# Patient Record
Sex: FEMALE | Race: BLACK OR AFRICAN AMERICAN | NOT HISPANIC OR LATINO | URBAN - METROPOLITAN AREA
[De-identification: names, ages, dates, MRNs, and addresses within clinical notes are randomized per-mention and may not be internally consistent; named-entity substitution may affect disease eponyms.]

---

## 2018-05-24 ENCOUNTER — IMPORTED ENCOUNTER (OUTPATIENT)
Dept: URBAN - METROPOLITAN AREA CLINIC 9 | Facility: CLINIC | Age: 73
End: 2018-05-24

## 2018-07-09 ENCOUNTER — IMPORTED ENCOUNTER (OUTPATIENT)
Dept: URBAN - METROPOLITAN AREA CLINIC 9 | Facility: CLINIC | Age: 73
End: 2018-07-09

## 2018-07-20 ENCOUNTER — IMPORTED ENCOUNTER (OUTPATIENT)
Dept: URBAN - METROPOLITAN AREA CLINIC 9 | Facility: CLINIC | Age: 73
End: 2018-07-20

## 2018-07-24 ENCOUNTER — IMPORTED ENCOUNTER (OUTPATIENT)
Dept: URBAN - METROPOLITAN AREA CLINIC 9 | Facility: CLINIC | Age: 73
End: 2018-07-24

## 2018-08-29 PROBLEM — Z96.1: Noted: 2018-08-29

## 2018-08-29 PROBLEM — Z96.1: Noted: 2018-07-19

## 2018-08-29 PROBLEM — Z98.890: Noted: 2022-05-19

## 2018-08-29 PROBLEM — Z98.890: Noted: 2022-05-09

## 2018-08-30 ENCOUNTER — IMPORTED ENCOUNTER (OUTPATIENT)
Dept: URBAN - METROPOLITAN AREA CLINIC 9 | Facility: CLINIC | Age: 73
End: 2018-08-30

## 2018-09-20 ENCOUNTER — IMPORTED ENCOUNTER (OUTPATIENT)
Dept: URBAN - METROPOLITAN AREA CLINIC 9 | Facility: CLINIC | Age: 73
End: 2018-09-20

## 2020-12-15 NOTE — PATIENT DISCUSSION
12/15/2020:  ed re-start gtts OD at [facility] and taper WITH OS (pt tapered gtts DAILY and has been off gtts OD for 3 days now! ).  ed may need thus to buy an extra bottle of PMN gtts to finish schedule.

## 2021-01-14 NOTE — PATIENT DISCUSSION
This visual field clearly demonstrated a minimum of 49% loss of upper field of vision OU, with upper lid skin in repose and elevated by taping of the lid to demonstrate potential correction. This field shows that taping the lids significantly improved this patient's superior field of vision by approximately 43%, OU.

## 2021-10-17 ASSESSMENT — KERATOMETRY
OS_AXISANGLE2_DEGREES: 84
OD_K1POWER_DIOPTERS: 43.75
OS_K2POWER_DIOPTERS: 43.75
OD_AXISANGLE2_DEGREES: 41
OS_AXISANGLE2_DEGREES: 163
OS_AXISANGLE_DEGREES: 73
OD_AXISANGLE_DEGREES: 64
OD_AXISANGLE_DEGREES: 131
OD_AXISANGLE2_DEGREES: 154
OS_K1POWER_DIOPTERS: 43.25
OS_K2POWER_DIOPTERS: 43.5
OD_K2POWER_DIOPTERS: 44.25
OD_K2POWER_DIOPTERS: 44
OD_K1POWER_DIOPTERS: 43.625
OS_K1POWER_DIOPTERS: 43.5
OS_AXISANGLE_DEGREES: 174

## 2021-10-17 ASSESSMENT — VISUAL ACUITY
OS_SC: 20/40 + SN
OD_CC: 20/25 SN
OD_SC: 20/50 +2 SN
OS_SC: 20/40 SN
OS_SC: 20/25 -2 SN
OS_CC: 20/25 SN
OD_CC: 20/40 SN
OD_SC: 20/40 SN
OS_SC: 20/50 + SN
OD_SC: 20/30 +2 SN
OS_CC: 20/40 SN

## 2021-10-17 ASSESSMENT — TONOMETRY
OD_IOP_MMHG: 16
OD_IOP_MMHG: 11
OS_IOP_MMHG: 13
OS_IOP_MMHG: 10
OS_IOP_MMHG: 11
OD_IOP_MMHG: 11
OS_IOP_MMHG: 15

## 2021-11-05 ENCOUNTER — PREPPED CHART (OUTPATIENT)
Dept: URBAN - NONMETROPOLITAN AREA CLINIC 6 | Facility: CLINIC | Age: 76
End: 2021-11-05

## 2021-11-05 ASSESSMENT — KERATOMETRY
OD_AXISANGLE2_DEGREES: 154
OD_AXISANGLE_DEGREES: 64
OS_AXISANGLE2_DEGREES: 163
OD_K1POWER_DIOPTERS: 43.625
OD_K2POWER_DIOPTERS: 44
OS_K1POWER_DIOPTERS: 43.5
OS_K2POWER_DIOPTERS: 43.75
OS_AXISANGLE_DEGREES: 73

## 2021-11-09 ENCOUNTER — ESTABLISHED PATIENT (OUTPATIENT)
Dept: URBAN - NONMETROPOLITAN AREA CLINIC 6 | Facility: CLINIC | Age: 76
End: 2021-11-09

## 2021-11-09 DIAGNOSIS — E11.9: ICD-10-CM

## 2021-11-09 DIAGNOSIS — H26.492: ICD-10-CM

## 2021-11-09 DIAGNOSIS — H04.122: ICD-10-CM

## 2021-11-09 DIAGNOSIS — H04.121: ICD-10-CM

## 2021-11-09 DIAGNOSIS — H26.491: ICD-10-CM

## 2021-11-09 PROCEDURE — 99214 OFFICE O/P EST MOD 30 MIN: CPT

## 2021-11-09 ASSESSMENT — KERATOMETRY
OS_K2POWER_DIOPTERS: 43.75
OD_K2POWER_DIOPTERS: 44
OD_K1POWER_DIOPTERS: 44.00
OD_AXISANGLE_DEGREES: 27
OS_AXISANGLE2_DEGREES: 163
OD_AXISANGLE_DEGREES: 64
OD_AXISANGLE2_DEGREES: 154
OD_AXISANGLE2_DEGREES: 117
OS_AXISANGLE_DEGREES: 78
OD_K1POWER_DIOPTERS: 43.625
OS_AXISANGLE_DEGREES: 73
OS_AXISANGLE2_DEGREES: 168
OS_K1POWER_DIOPTERS: 43.50
OS_K1POWER_DIOPTERS: 43.5
OD_K2POWER_DIOPTERS: 44.50

## 2021-11-09 ASSESSMENT — VISUAL ACUITY
OU_SC: 20/25
OS_SC: 20/25-2
OD_SC: 20/25-1

## 2021-11-09 ASSESSMENT — TONOMETRY
OS_IOP_MMHG: 13
OD_IOP_MMHG: 14

## 2022-05-09 ENCOUNTER — CLINIC PROCEDURE ONLY (OUTPATIENT)
Dept: URBAN - NONMETROPOLITAN AREA CLINIC 6 | Facility: CLINIC | Age: 77
End: 2022-05-09

## 2022-05-09 DIAGNOSIS — H26.493: ICD-10-CM

## 2022-05-09 PROCEDURE — 66821 AFTER CATARACT LASER SURGERY: CPT

## 2022-05-09 ASSESSMENT — VISUAL ACUITY
OD_SC: 20/30-1
OU_SC: 20/30-1
OS_SC: 20/30-2

## 2022-05-09 ASSESSMENT — TONOMETRY: OD_IOP_MMHG: 11

## 2022-05-19 ENCOUNTER — POST-OP (OUTPATIENT)
Dept: URBAN - NONMETROPOLITAN AREA CLINIC 6 | Facility: CLINIC | Age: 77
End: 2022-05-19

## 2022-05-19 DIAGNOSIS — Z98.890: ICD-10-CM

## 2022-05-19 DIAGNOSIS — H26.492: ICD-10-CM

## 2022-05-19 PROBLEM — Z96.1: Noted: 2018-07-19

## 2022-05-19 PROBLEM — Z96.1: Noted: 2018-08-29

## 2022-05-19 PROCEDURE — 66821 AFTER CATARACT LASER SURGERY: CPT

## 2022-05-19 PROCEDURE — 99024 POSTOP FOLLOW-UP VISIT: CPT

## 2022-05-19 ASSESSMENT — KERATOMETRY
OD_AXISANGLE_DEGREES: 102
OD_AXISANGLE2_DEGREES: 12
OD_K2POWER_DIOPTERS: 44.00
OD_K1POWER_DIOPTERS: 43.50

## 2022-05-19 ASSESSMENT — TONOMETRY: OD_IOP_MMHG: 13

## 2022-05-19 ASSESSMENT — VISUAL ACUITY: OD_SC: 20/20-2

## 2022-06-01 ENCOUNTER — POST-OP (OUTPATIENT)
Dept: URBAN - NONMETROPOLITAN AREA CLINIC 6 | Facility: CLINIC | Age: 77
End: 2022-06-01

## 2022-06-01 DIAGNOSIS — Z98.890: ICD-10-CM

## 2022-06-01 PROCEDURE — 99024 POSTOP FOLLOW-UP VISIT: CPT

## 2022-06-01 ASSESSMENT — VISUAL ACUITY
OD_SC: 20/20
OU_SC: 20/20
OS_SC: 20/25-1

## 2022-06-01 ASSESSMENT — KERATOMETRY
OS_AXISANGLE2_DEGREES: 133
OS_AXISANGLE_DEGREES: 43
OD_K1POWER_DIOPTERS: 43.25
OS_K1POWER_DIOPTERS: 43.25
OS_K2POWER_DIOPTERS: 43.50
OD_AXISANGLE2_DEGREES: 9
OD_AXISANGLE_DEGREES: 99
OD_K2POWER_DIOPTERS: 44.00

## 2022-06-01 ASSESSMENT — TONOMETRY
OD_IOP_MMHG: 13
OS_IOP_MMHG: 14

## 2022-11-02 ENCOUNTER — COMPREHENSIVE EXAM (OUTPATIENT)
Dept: URBAN - NONMETROPOLITAN AREA CLINIC 6 | Facility: CLINIC | Age: 77
End: 2022-11-02

## 2022-11-02 DIAGNOSIS — H04.123: ICD-10-CM

## 2022-11-02 DIAGNOSIS — E11.9: ICD-10-CM

## 2022-11-02 DIAGNOSIS — Z96.1: ICD-10-CM

## 2022-11-02 PROCEDURE — 92014 COMPRE OPH EXAM EST PT 1/>: CPT

## 2022-11-02 PROCEDURE — 92015 DETERMINE REFRACTIVE STATE: CPT

## 2022-11-02 ASSESSMENT — TONOMETRY
OD_IOP_MMHG: 13
OS_IOP_MMHG: 14

## 2022-11-02 ASSESSMENT — VISUAL ACUITY
OS_SC: 20/20
OU_SC: 20/20
OD_SC: 20/20

## 2022-11-02 ASSESSMENT — KERATOMETRY
OD_AXISANGLE2_DEGREES: 90
OD_K1POWER_DIOPTERS: 43.75
OD_K2POWER_DIOPTERS: 44.25
OS_AXISANGLE2_DEGREES: 122
OS_AXISANGLE_DEGREES: 32
OD_AXISANGLE_DEGREES: 180
OS_K2POWER_DIOPTERS: 43.75
OS_K1POWER_DIOPTERS: 43.25

## 2023-07-16 NOTE — PATIENT DISCUSSION
CATARACT SURGERY PLANNED FOR TDAY - OD.
Continue current management.
GRADE 2 TODAY.
MONITOR.
PLAN CE IOL OD BASIC MUNIR THEN OS BASIC MUNIR.
Post op gtt instructions reviewed with patient.
Pt chooses Basic bimal understands the need for glasses for distance and near.
The patient feels that the cataract is significantly impacting daily activities and has elected cataract surgery. The risks, benefits, and alternatives to surgery were discussed. The patient elects to proceed with surgery.
The types of intraocular lenses were reviewed with the patient along with a discussion of their various strengths and weaknesses.
iMPRIMIS.
verbal cues/1 person assist

## 2023-12-11 ENCOUNTER — ESTABLISHED PATIENT (OUTPATIENT)
Dept: URBAN - NONMETROPOLITAN AREA CLINIC 6 | Facility: CLINIC | Age: 78
End: 2023-12-11

## 2023-12-11 DIAGNOSIS — H04.123: ICD-10-CM

## 2023-12-11 DIAGNOSIS — E11.9: ICD-10-CM

## 2023-12-11 PROCEDURE — 92014 COMPRE OPH EXAM EST PT 1/>: CPT

## 2023-12-11 ASSESSMENT — VISUAL ACUITY
OS_SC: 20/25
OU_SC: 20/20-2
OD_SC: 20/20-2

## 2023-12-11 ASSESSMENT — KERATOMETRY
OS_AXISANGLE_DEGREES: 180
OS_K2POWER_DIOPTERS: 43.75
OD_K1POWER_DIOPTERS: 43.75
OS_K1POWER_DIOPTERS: 43.50
OS_AXISANGLE2_DEGREES: 90
OD_AXISANGLE_DEGREES: 141
OD_K2POWER_DIOPTERS: 44.25
OD_AXISANGLE2_DEGREES: 51

## 2023-12-11 ASSESSMENT — TONOMETRY
OS_IOP_MMHG: 11
OD_IOP_MMHG: 13

## 2024-10-03 ENCOUNTER — EMERGENCY VISIT (OUTPATIENT)
Dept: URBAN - NONMETROPOLITAN AREA CLINIC 6 | Facility: CLINIC | Age: 79
End: 2024-10-03

## 2024-10-03 DIAGNOSIS — H04.123: ICD-10-CM

## 2024-10-03 PROCEDURE — 92012 INTRM OPH EXAM EST PATIENT: CPT

## 2024-12-19 ENCOUNTER — COMPREHENSIVE EXAM (OUTPATIENT)
Age: 79
End: 2024-12-19

## 2024-12-19 DIAGNOSIS — H04.123: ICD-10-CM

## 2024-12-19 DIAGNOSIS — E11.9: ICD-10-CM

## 2024-12-19 PROCEDURE — 92014 COMPRE OPH EXAM EST PT 1/>: CPT

## 2024-12-19 PROCEDURE — 92134 CPTRZ OPH DX IMG PST SGM RTA: CPT | Mod: NC
